# Patient Record
Sex: MALE | Race: WHITE | Employment: OTHER | URBAN - NONMETROPOLITAN AREA
[De-identification: names, ages, dates, MRNs, and addresses within clinical notes are randomized per-mention and may not be internally consistent; named-entity substitution may affect disease eponyms.]

---

## 2019-11-05 DIAGNOSIS — G35 MULTIPLE SCLEROSIS (HCC): ICD-10-CM

## 2019-11-06 ENCOUNTER — HOSPITAL ENCOUNTER (OUTPATIENT)
Dept: INFUSION THERAPY | Age: 29
Setting detail: INFUSION SERIES
Discharge: HOME | End: 2019-11-06
Payer: COMMERCIAL

## 2019-11-06 DIAGNOSIS — G35 MULTIPLE SCLEROSIS (HCC): Primary | ICD-10-CM

## 2019-11-06 PROCEDURE — 2580000003 HC RX 258: Performed by: PSYCHIATRY & NEUROLOGY

## 2019-11-06 PROCEDURE — 96365 THER/PROPH/DIAG IV INF INIT: CPT

## 2019-11-06 PROCEDURE — 6360000002 HC RX W HCPCS: Performed by: PSYCHIATRY & NEUROLOGY

## 2019-11-06 RX ORDER — FLUOXETINE 20 MG/1
40 TABLET, FILM COATED ORAL
COMMUNITY
Start: 2018-10-03 | End: 2020-11-09 | Stop reason: ALTCHOICE

## 2019-11-06 RX ORDER — DEXTROAMPHETAMINE SACCHARATE, AMPHETAMINE ASPARTATE MONOHYDRATE, DEXTROAMPHETAMINE SULFATE AND AMPHETAMINE SULFATE 7.5; 7.5; 7.5; 7.5 MG/1; MG/1; MG/1; MG/1
30 CAPSULE, EXTENDED RELEASE ORAL EVERY MORNING
COMMUNITY
Start: 2018-10-03

## 2019-11-06 RX ADMIN — SODIUM CHLORIDE 1000 MG: 9 INJECTION, SOLUTION INTRAVENOUS at 14:24

## 2019-11-07 ENCOUNTER — HOSPITAL ENCOUNTER (OUTPATIENT)
Dept: INFUSION THERAPY | Age: 29
Setting detail: INFUSION SERIES
Discharge: HOME | End: 2019-11-07
Payer: COMMERCIAL

## 2019-11-07 VITALS
HEART RATE: 79 BPM | OXYGEN SATURATION: 99 % | DIASTOLIC BLOOD PRESSURE: 72 MMHG | TEMPERATURE: 98.6 F | RESPIRATION RATE: 18 BRPM | SYSTOLIC BLOOD PRESSURE: 129 MMHG

## 2019-11-07 DIAGNOSIS — G35 MULTIPLE SCLEROSIS (HCC): Primary | ICD-10-CM

## 2019-11-07 PROCEDURE — 2580000003 HC RX 258: Performed by: PSYCHIATRY & NEUROLOGY

## 2019-11-07 PROCEDURE — 96365 THER/PROPH/DIAG IV INF INIT: CPT

## 2019-11-07 PROCEDURE — 6360000002 HC RX W HCPCS: Performed by: PSYCHIATRY & NEUROLOGY

## 2019-11-07 RX ADMIN — SODIUM CHLORIDE 1000 MG: 9 INJECTION, SOLUTION INTRAVENOUS at 13:23

## 2019-11-08 ENCOUNTER — HOSPITAL ENCOUNTER (OUTPATIENT)
Dept: INFUSION THERAPY | Age: 29
Setting detail: INFUSION SERIES
Discharge: HOME | End: 2019-11-08
Payer: COMMERCIAL

## 2019-11-08 VITALS
OXYGEN SATURATION: 97 % | RESPIRATION RATE: 17 BRPM | DIASTOLIC BLOOD PRESSURE: 86 MMHG | HEART RATE: 66 BPM | SYSTOLIC BLOOD PRESSURE: 139 MMHG | TEMPERATURE: 98.9 F

## 2019-11-08 DIAGNOSIS — G35 MULTIPLE SCLEROSIS (HCC): Primary | ICD-10-CM

## 2019-11-08 PROCEDURE — 96365 THER/PROPH/DIAG IV INF INIT: CPT

## 2019-11-08 PROCEDURE — 6360000002 HC RX W HCPCS: Performed by: PSYCHIATRY & NEUROLOGY

## 2019-11-08 PROCEDURE — 2580000003 HC RX 258: Performed by: PSYCHIATRY & NEUROLOGY

## 2019-11-08 RX ADMIN — SODIUM CHLORIDE 1000 MG: 9 INJECTION, SOLUTION INTRAVENOUS at 13:49

## 2020-05-04 ENCOUNTER — OFFICE VISIT (OUTPATIENT)
Age: 30
End: 2020-05-04

## 2020-05-04 VITALS — TEMPERATURE: 97.9 F | OXYGEN SATURATION: 98 %

## 2020-05-04 PROCEDURE — 99999 PR OFFICE/OUTPT VISIT,PROCEDURE ONLY: CPT | Performed by: NURSE PRACTITIONER

## 2020-05-04 NOTE — PATIENT INSTRUCTIONS
be washed thoroughly with soap and water. Clean all high-touch surfaces everyday  High touch surfaces include counters, tabletops, doorknobs, bathroom fixtures, toilets, phones, keyboards, tablets, and bedside tables. Also, clean any surfaces that may have blood, stool, or body fluids on them. Use a household cleaning spray or wipe, according to the label instructions. Labels contain instructions for safe and effective use of the cleaning product including precautions you should take when applying the product, such as wearing gloves and making sure you have good ventilation during use of the product. Monitor your symptoms  Seek prompt medical attention if your illness is worsening (e.g., difficulty breathing). Before seeking care, call your healthcare provider and tell them that you have, or are being evaluated for, COVID-19. Put on a facemask before you enter the facility. These steps will help the healthcare providers office to keep other people in the office or waiting room from getting infected or exposed. Ask your healthcare provider to call the local or [facility] health department. Persons who are placed under active monitoring or facilitated self-monitoring should follow instructions provided by their local health department or occupational health professionals, as appropriate. When working with your local health department check their available hours. If you have a medical emergency and need to call 911, notify the dispatch personnel that you have, or are being evaluated for COVID-19. If possible, put on a facemask before emergency medical services arrive. Discontinuing home isolation  Patients with confirmed COVID-19 should remain under home isolation precautions until the risk of secondary transmission to others is thought to be low.  The decision to discontinue home isolation precautions should be made on a case-by-case basis, in consultation with healthcare providers and state and [facility] health departments. [employer] allows you to send messages to your doctor, view your test results, renew your prescriptions, schedule appointments, view visit notes, and more. How Do I Sign Up? 1. In your Internet browser, go to [email or web address]  2. Click on the Sign Up Now link in the Sign In box. You will see the New Member Sign Up page. 3. Enter your [employer] Access Code exactly as it appears below. You will not need to use this code after youve completed the sign-up process. If you do not sign up before the expiration date, you must request a new code. [employer] Access Code: [email or web address]  Expires: 6/18/2020 11:34 AM    4. Enter your Social Security Number ([id number]) and Date of Birth (mm/dd/yyyy) as indicated and click Submit. You will be taken to the next sign-up page. 5. Create a [employer] ID. This will be your [employer] login ID and cannot be changed, so think of one that is secure and easy to remember. 6. Create a [employer] password. You can change your password at any time. 7. Enter your Password Reset Question and Answer. This can be used at a later time if you forget your password. 8. Enter your e-mail address. You will receive e-mail notification when new information is available in [address]. 9. Click Sign Up. You can now view your medical record. Additional Information  If you have questions, please contact the physician practice where you receive care. Remember, [employer] is NOT to be used for urgent needs. For medical emergencies, dial 911. For questions regarding your [employer] account call [phone]. If you have a clinical question, please call your doctor's office.

## 2020-05-06 LAB
REPORT: NORMAL
SARS-COV-2: NOT DETECTED
THIS TEST SENT TO: NORMAL

## 2020-05-14 ENCOUNTER — OFFICE VISIT (OUTPATIENT)
Age: 30
End: 2020-05-14

## 2020-05-16 LAB
REPORT: NORMAL
SARS-COV-2: NOT DETECTED
THIS TEST SENT TO: NORMAL

## 2020-06-08 ENCOUNTER — TELEMEDICINE (OUTPATIENT)
Dept: VASCULAR SURGERY | Age: 30
End: 2020-06-08
Payer: COMMERCIAL

## 2020-06-08 PROCEDURE — 99203 OFFICE O/P NEW LOW 30 MIN: CPT | Performed by: NURSE PRACTITIONER

## 2020-06-08 SDOH — HEALTH STABILITY: MENTAL HEALTH: HOW OFTEN DO YOU HAVE A DRINK CONTAINING ALCOHOL?: 2-3 TIMES A WEEK

## 2020-06-12 ENCOUNTER — HOSPITAL ENCOUNTER (OUTPATIENT)
Dept: VASCULAR LAB | Age: 30
Discharge: HOME | End: 2020-06-12
Payer: COMMERCIAL

## 2020-06-12 PROCEDURE — 93970 EXTREMITY STUDY: CPT

## 2020-08-18 ENCOUNTER — TELEPHONE (OUTPATIENT)
Dept: URGENT CARE | Facility: CLINIC | Age: 30
End: 2020-08-18

## 2020-08-18 PROCEDURE — 87635 SARS-COV-2 COVID-19 AMP PRB: CPT | Performed by: NURSE PRACTITIONER

## 2020-08-18 NOTE — TELEPHONE ENCOUNTER
COVID-19 Test Result   Telephone Encounter    Patient Name: Jakob Chong   : 1990   MRN: [id number]     COVID19   Date Value Ref Range Status   2020 Detected (C) Not Detected - Ref. Range Final        Patient was counseled as follows:  • (+) positive COVID-19 test result with or without symptoms   • The majority of patients with a positive test result will recover, symptom severity is variable among those infected.   • Because Jakob is symptomatic then he may discontinue home isolation when the following criteria are met:   o At least seven days have passed since symptoms first appeared AND   o At least three days (24 hours) have passed since recovery of symptoms (defined as resolution of fever without the use of fever-reducing medications and improvement in respiratory symptoms [eg, cough, shortness of breath])   • For support of non-emergent symptoms expected with this virus such as increased shortness of breath, fever, cough, or other questions, Jakob was asked to please call their primary care physician’s office.  • He was advised to seek care in the Emergency Department should extreme symptoms arise such as new onset confusion, extreme lethargy, hypoxia, or new hypotension.   · Questions were engaged and answered to the best of my ability, patient expressed verbal understanding of their test results and my advice.      Primary Care Physician verified as being: Provider, No Known      Electronically signed by MARY ELLEN Jackson, 20, 9:55 AM.

## 2020-09-17 ENCOUNTER — VIRTUAL VISIT (OUTPATIENT)
Dept: VASCULAR SURGERY | Age: 30
End: 2020-09-17
Payer: COMMERCIAL

## 2020-09-17 PROBLEM — I83.93 VARICOSE VEINS OF LEGS: Status: ACTIVE | Noted: 2020-09-17

## 2020-09-17 PROBLEM — I87.2 VENOUS REFLUX: Status: ACTIVE | Noted: 2020-09-17

## 2020-09-17 PROCEDURE — 99214 OFFICE O/P EST MOD 30 MIN: CPT | Performed by: NURSE PRACTITIONER

## 2020-09-17 NOTE — Clinical Note
Please call patient and let him know dr. Mateo Cheatham said we can do ablation left leg and left leg stab phlebectomy. See if he is ready to schedule. Will do in or.   Anwser any questions and give to jennifer to schedule

## 2020-09-17 NOTE — PROGRESS NOTES
2020    TELEHEALTH EVALUATION -- Audio/Visual (During LGMGT-52 public health emergency)    HPI:    Patient is located at home  Provider is located at [facility] [place]   Also present during call is no one    Janusz Field (:  1990) has requested an audio/video evaluation for the following concern(s):    He has a known history of of varicose veins and chronic venous insufficiency. He reports prominent veins on the  Bilateral leg(s), lower extremity edema on the  Left leg(s), the veins are painful -- severity:  moderate and pain is aggravated by upright posture. He reports previous treatment includes analgesics  Prescriptions strength hose for more than 3 months. He continues to have significant pain around area of varicosity. This is an aching type pain. He continues throughout the day limiting how long he can stand. He does not have a history of spontaneous rupture. Differential diagnosis for leg pain includes but is not limited to: varicose veins, peripheral vascular disease, arthritic pain, DVT, lumbar disc disease, and neurogenic pain. Prior to Visit Medications    Medication Sig Taking? Authorizing Provider   Glatiramer Acetate (COPAXONE SC) Inject into the skin  Historical Provider, MD   amphetamine-dextroamphetamine (ADDERALL XR) 30 MG extended release capsule Take 30 mg by mouth.   Historical Provider, MD   FLUoxetine (PROZAC) 20 MG tablet Take 40 mg by mouth  Historical Provider, MD       Social History     Tobacco Use    Smoking status: Never Smoker    Smokeless tobacco: Never Used   Substance Use Topics    Alcohol use: Yes     Frequency: 2-3 times a week    Drug use: Never        No Known Allergies,   Past Medical History:   Diagnosis Date    Depression     MS (multiple sclerosis) ([facility] [address]    ,   Past Surgical History:   Procedure Laterality Date    TYMPANOSTOMY TUBE PLACEMENT     ,   Social History     Tobacco Use    Smoking status: Never Smoker    Smokeless tobacco: Never Used   Substance Use Topics    Alcohol use: Yes     Frequency: 2-3 times a week    Drug use: Never   , History reviewed. No pertinent family history. ,   Health Maintenance   Topic Date Due    Varicella vaccine (1 of 2 - 2-dose childhood series) 02/20/1993    HIV screen  02/20/2007    DTaP/Tdap/Td vaccine (1 - Tdap) 02/20/2011    Flu vaccine (1) 09/01/2020    Hepatitis A vaccine  Aged Out    Hepatitis B vaccine  Aged Out    Hib vaccine  Aged Out    Meningococcal (ACWY) vaccine  Aged Out    Pneumococcal 0-64 years Vaccine  Aged Out       Review of Systems    Constitutional - no significant activity change, appetite change, or unexpected weight change. No fever or chills. No diaphoresis or significant fatigue. HENT - no significant rhinorrhea or epistaxis. No tinnitus or significant hearing loss. Eyes - no sudden vision change or amaurosis. Respiratory - no significant shortness of breath, wheezing, or stridor. No apnea, cough, or chest tightness associated with shortness of breath. Cardiovascular - no chest pain, syncope, or significant dizziness. No palpitations or significant leg swelling.  has not had claudication. Gastrointestinal -  has not had abdominal swelling or pain. No blood in stool. No severe constipation, diarrhea, nausea, or vomiting. Genitourinary - No difficulty urinating, dysuria, frequency, or urgency. No flank pain or hematuria. Musculoskeletal - has not had back pain, gait disturbance, or myalgia. Skin - no color change, rash, pallor, or new wound. Neurologic - no dizziness, facial asymmetry, or light headedness. No seizures. No speech difficulty or lateralizing weakness. Hematologic - no easy bruising or excessive bleeding. Psychiatric - no severe anxiety or nervousness. No confusion. Had covid about a month ago. He was tested [facility] urgent care and is out of quarantine  All other review of systems are negative.       PHYSICAL EXAMINATION:    Due to this being a TeleHealth encounter, evaluation of the following organ systems is limited: Vitals/Constitutional/EENT/Resp/CV/GI//MS/Neuro/Skin/Heme-Lymph-Imm. Constitutional - well developed, well nourished. No diaphoresis or acute distress. HENT - head normocephalic. Right external ear canal appears normal.  Left external ear canal appears normal.  Septum appears midline. Eyes - conjunctiva normal.   No exudate. No icterus. Neck- ROM appears normal, no tracheal deviation. Extremities -No cyanosis, clubbing, no edema. No signs atheroembolic event. Large visible varicose vein cluster at popliteal fossa left leg  Pulmonary - effort appears normal.  No respiratory distress. No accessory muscle use  Musculoskeletal -   Motor grossly intact in visible lower extremities and upper extremities  Neurologic - alert and oriented X 3. Cranial Nerves II-XII grossly intact  Skin - intact. No rash, erythema, or pallor. Psychiatric - mood, affect, and behavior appear normal.  Judgment and thought processes appear normal.    Venous scan -   Right Side: The greater saphenous vein exhibits reflux lasting for a     maximum of 1291 milliseconds in thesaphenofemoral junction. There is no     evidence of deep venous thrombosis in the segments insonated. There is     1848 m seconds of deep vein reflux.    Maria Alejandra Meriden is no short saphenous vein reflux.     Note also made of Right greater saphenous vein branch coming off at distal     thigh level with reflux of 5420 msec.          Left Side: The greater saphenous vein exhibits reflux lasting for a     maximum of 6609 milliseconds in the saphenofemoral junction. There is no     evidence of deep venous thrombosis in the segments insonated. There is     5883m seconds of deep vein reflux.    Maria Alejandra Meriden is no short saphenous vein reflux.    Maria Alejandra Meriden is a large branch (17.1mm diameter) comming off the Greater     Saphenous vein just above the knee.  Reflux of 6609 msec is demonstrated.     Note Study done with standing protocol.             Individual images were reviewed. Results were reviewed with the patient. Options have been discussed with the patient including continued medical management vs. proceeding with left GSV ablation and stab phlebectomy left leg. Patient has opted to proceed with this. Risks have been discussed with the patient including but not limited to MI, death, CVA, bleed, nerve injury, and infection. ASSESSMENT/PLAN:  1. Varicose veins of both lower extremities with pain    2. Venous reflux        No follow-ups on file. Support hose  20-30 mmHg compression  Start/Continue ASA EC 81 mg daily  Leg elevation  Good moisturization  Good skin care  Diet   excercise  Left gsv ablation and stab phlebectomy left leg  Strongly encouraged start/continue statin therapy  Recommended no smoking  An  electronic signature was used to authenticate this note. --PAUAL Dennison on 9/17/2020 at 9:51 AM        Pursuant to the emergency declaration under the [facility], [facility] waiver authority and the [employer] and [employer] Act, this Virtual  Visit was conducted, with patient's consent, to reduce the patient's risk of exposure to COVID-19 and provide continuity of care for an established patient. Services were provided through a video synchronous discussion virtually to substitute for in-person clinic visit.

## 2020-09-18 ENCOUNTER — TELEPHONE (OUTPATIENT)
Dept: VASCULAR SURGERY | Age: 30
End: 2020-09-18

## 2020-09-18 RX ORDER — SODIUM CHLORIDE 0.9 % (FLUSH) 0.9 %
10 SYRINGE (ML) INJECTION EVERY 12 HOURS SCHEDULED
Status: CANCELLED | OUTPATIENT
Start: 2020-09-18

## 2020-09-18 RX ORDER — SODIUM CHLORIDE 0.9 % (FLUSH) 0.9 %
10 SYRINGE (ML) INJECTION PRN
Status: CANCELLED | OUTPATIENT
Start: 2020-09-18

## 2020-09-18 NOTE — TELEPHONE ENCOUNTER
----- Message from PAULA Canas sent at 9/18/2020  4:32 AM CDT -----  Please call patient and let him know dr. Kelly Mcbride said we can do ablation left leg and left leg stab phlebectomy. See if he is ready to schedule. Will do in or.   Anwser any questions and give to jennifer to schedule

## 2020-09-18 NOTE — H&P
2020    TELEHEALTH EVALUATION -- Audio/Visual (During KTNBW-60 public health emergency)    HPI:    Patient is located at home  Provider is located at [facility] [place]   Also present during call is no one    Bisijonnie Walden (:  1990) has requested an audio/video evaluation for the following concern(s):    He has a known history of of varicose veins and chronic venous insufficiency. He reports prominent veins on the  Bilateral leg(s), lower extremity edema on the  Left leg(s), the veins are painful -- severity:  moderate and pain is aggravated by upright posture. He reports previous treatment includes analgesics  Prescriptions strength hose for more than 3 months. He continues to have significant pain around area of varicosity. This is an aching type pain. He continues throughout the day limiting how long he can stand. He does not have a history of spontaneous rupture. Differential diagnosis for leg pain includes but is not limited to: varicose veins, peripheral vascular disease, arthritic pain, DVT, lumbar disc disease, and neurogenic pain. Prior to Visit Medications    Medication Sig Taking? Authorizing Provider   Glatiramer Acetate (COPAXONE SC) Inject into the skin  Historical Provider, MD   amphetamine-dextroamphetamine (ADDERALL XR) 30 MG extended release capsule Take 30 mg by mouth.   Historical Provider, MD   FLUoxetine (PROZAC) 20 MG tablet Take 40 mg by mouth  Historical Provider, MD       Social History     Tobacco Use    Smoking status: Never Smoker    Smokeless tobacco: Never Used   Substance Use Topics    Alcohol use: Yes     Frequency: 2-3 times a week    Drug use: Never        No Known Allergies,   Past Medical History:   Diagnosis Date    Depression     MS (multiple sclerosis) ([facility] [address]    ,   Past Surgical History:   Procedure Laterality Date    TYMPANOSTOMY TUBE PLACEMENT     ,   Social History     Tobacco Use    Smoking status: Never Smoker    Smokeless tobacco: Never Used   Substance Use Topics    Alcohol use: Yes     Frequency: 2-3 times a week    Drug use: Never   , History reviewed. No pertinent family history. ,   Health Maintenance   Topic Date Due    Varicella vaccine (1 of 2 - 2-dose childhood series) 02/20/1993    HIV screen  02/20/2007    DTaP/Tdap/Td vaccine (1 - Tdap) 02/20/2011    Flu vaccine (1) 09/01/2020    Hepatitis A vaccine  Aged Out    Hepatitis B vaccine  Aged Out    Hib vaccine  Aged Out    Meningococcal (ACWY) vaccine  Aged Out    Pneumococcal 0-64 years Vaccine  Aged Out       Review of Systems    Constitutional - no significant activity change, appetite change, or unexpected weight change. No fever or chills. No diaphoresis or significant fatigue. HENT - no significant rhinorrhea or epistaxis. No tinnitus or significant hearing loss. Eyes - no sudden vision change or amaurosis. Respiratory - no significant shortness of breath, wheezing, or stridor. No apnea, cough, or chest tightness associated with shortness of breath. Cardiovascular - no chest pain, syncope, or significant dizziness. No palpitations or significant leg swelling.  has not had claudication. Gastrointestinal -  has not had abdominal swelling or pain. No blood in stool. No severe constipation, diarrhea, nausea, or vomiting. Genitourinary - No difficulty urinating, dysuria, frequency, or urgency. No flank pain or hematuria. Musculoskeletal - has not had back pain, gait disturbance, or myalgia. Skin - no color change, rash, pallor, or new wound. Neurologic - no dizziness, facial asymmetry, or light headedness. No seizures. No speech difficulty or lateralizing weakness. Hematologic - no easy bruising or excessive bleeding. Psychiatric - no severe anxiety or nervousness. No confusion. Had covid about a month ago. He was tested [facility] urgent care and is out of quarantine  All other review of systems are negative.       PHYSICAL EXAMINATION:    Due to this being a TeleHealth encounter, evaluation of the following organ systems is limited: Vitals/Constitutional/EENT/Resp/CV/GI//MS/Neuro/Skin/Heme-Lymph-Imm. Constitutional - well developed, well nourished. No diaphoresis or acute distress. HENT - head normocephalic. Right external ear canal appears normal.  Left external ear canal appears normal.  Septum appears midline. Eyes - conjunctiva normal.   No exudate. No icterus. Neck- ROM appears normal, no tracheal deviation. Extremities -No cyanosis, clubbing, no edema. No signs atheroembolic event. Large visible varicose vein cluster at popliteal fossa left leg  Pulmonary - effort appears normal.  No respiratory distress. No accessory muscle use  Musculoskeletal -   Motor grossly intact in visible lower extremities and upper extremities  Neurologic - alert and oriented X 3. Cranial Nerves II-XII grossly intact  Skin - intact. No rash, erythema, or pallor. Psychiatric - mood, affect, and behavior appear normal.  Judgment and thought processes appear normal.    Venous scan -   Right Side: The greater saphenous vein exhibits reflux lasting for a     maximum of 1291 milliseconds in thesaphenofemoral junction. There is no     evidence of deep venous thrombosis in the segments insonated. There is     1848 m seconds of deep vein reflux.    Monika Friday is no short saphenous vein reflux.     Note also made of Right greater saphenous vein branch coming off at distal     thigh level with reflux of 5420 msec.          Left Side: The greater saphenous vein exhibits reflux lasting for a     maximum of 6609 milliseconds in the saphenofemoral junction. There is no     evidence of deep venous thrombosis in the segments insonated. There is     5883m seconds of deep vein reflux.    Monika Friday is no short saphenous vein reflux.    Monika Friday is a large branch (17.1mm diameter) comming off the Greater     Saphenous vein just above the knee.  Reflux of 6609 msec is demonstrated.     Note Study done with standing protocol.             Individual images were reviewed. Results were reviewed with the patient. Options have been discussed with the patient including continued medical management vs. proceeding with left GSV ablation and stab phlebectomy left leg. Patient has opted to proceed with this. Risks have been discussed with the patient including but not limited to MI, death, CVA, bleed, nerve injury, and infection. ASSESSMENT/PLAN:  1. Varicose veins of both lower extremities with pain    2. Venous reflux        No follow-ups on file. Support hose  20-30 mmHg compression  Start/Continue ASA EC 81 mg daily  Leg elevation  Good moisturization  Good skin care  Diet   excercise  Left gsv ablation and stab phlebectomy left leg  Strongly encouraged start/continue statin therapy  Recommended no smoking  An  electronic signature was used to authenticate this note. --PAULA Hubbard on 9/17/2020 at 9:51 AM        Pursuant to the emergency declaration under the [facility], [facility] waiver authority and the [employer] and [employer] Act, this Virtual  Visit was conducted, with patient's consent, to reduce the patient's risk of exposure to COVID-19 and provide continuity of care for an established patient. Services were provided through a video synchronous discussion virtually to substitute for in-person clinic visit.

## 2020-09-18 NOTE — TELEPHONE ENCOUNTER
I sw the pt to let him know Dr. Jamal Machado recommends him to have a L GSV RFA as well as a stab phlebectomy of the LLE in the OR. I asked the pt if he is willing to proceed with these recommendations? Pt states yes. I asked the pt if he has any questions regarding these procedures? Pt states Katarina Prakash filled him in on all the details during their visit and he has a good understanding on what to expect. I will pass this information along to [facility] for further scheduling. Pt voiced understanding and is aware of the POC.

## 2020-10-08 ENCOUNTER — TELEPHONE (OUTPATIENT)
Dept: VASCULAR SURGERY | Age: 30
End: 2020-10-08

## 2020-10-08 NOTE — TELEPHONE ENCOUNTER
Unable to reach patient by phone in regards to scheduling surgery with Dr. Terrell Birch, left message for patient to call the office. 10/12/20 3:44pm Spoke with patient, details/instructions and medications were addressed (see my chart message) for surgery at [place] on 11/13/20 with Dr. Terrell Birch. He will need to arrive at the [address] [place] [address] at 7:30am. He will need a  to take him home. He will need to use Bactroban Ointment prior to surgery. He will need to apply the Bactroban Ointment to the inside of his nose on each side twice a day for 5 days starting on 11/2/20. The Bactroban Ointment has been sent to the [facility] in [facility]. To ensure the health and safety of our patients and staff, [address] [place] [address],[address] has implemented visitor restrictions. Only one person will be allowed to accompany you for your procedure. If you or your visitor are exhibiting signs & symptoms of illness such as fever, cough, sore throat or body aches, we ask that you reschedule your procedure to a later date after your symptoms have been resolved. New policy requires that anyone who comes into the hospital will be required to wear a face mask. A cloth mask is acceptable. He will need to register at the [address] [place] [address] on Mon. 11/9/20 at 9:30am for pre-op and COVID-19 test. After the COVID-19 test we suggest that he self quarantine until surgery. After his surgery he will need to have a left leg venous scan and follow-up appointment. He will need to register at the [facility] (go to the main hospital entrance doors and turn left) on Mon. 11/16/20 at 12:30pm for a left leg venous scan at 1:00pm. We have him scheduled for a VV appointment with PAULA Howell on Tues. 11/17/20 at 10:00am. Written instructions were sent to patient through my chart message.

## 2020-11-09 ENCOUNTER — HOSPITAL ENCOUNTER (OUTPATIENT)
Dept: GENERAL RADIOLOGY | Age: 30
Discharge: HOME | End: 2020-11-09
Payer: COMMERCIAL

## 2020-11-09 ENCOUNTER — HOSPITAL ENCOUNTER (OUTPATIENT)
Dept: PREADMISSION TESTING | Age: 30
Discharge: HOME | End: 2020-11-13
Payer: COMMERCIAL

## 2020-11-09 VITALS — HEIGHT: 72 IN | WEIGHT: 195 LBS | BODY MASS INDEX: 26.41 KG/M2

## 2020-11-09 LAB
ANION GAP SERPL CALCULATED.3IONS-SCNC: 9 MMOL/L (ref 7–19)
APTT: 30.2 SEC (ref 26–36.2)
BUN BLDV-MCNC: 11 MG/DL (ref 6–20)
CALCIUM SERPL-MCNC: 9.2 MG/DL (ref 8.6–10)
CHLORIDE BLD-SCNC: 105 MMOL/L (ref 98–111)
CO2: 26 MMOL/L (ref 22–29)
CREAT SERPL-MCNC: 0.7 MG/DL (ref 0.5–1.2)
GFR AFRICAN AMERICAN: >59
GFR NON-AFRICAN AMERICAN: >60
GLUCOSE BLD-MCNC: 96 MG/DL (ref 74–109)
HCT VFR BLD CALC: 43.5 % (ref 42–52)
HEMOGLOBIN: 14.9 G/DL (ref 14–18)
INR BLD: 1.09 (ref 0.88–1.18)
MCH RBC QN AUTO: 30.8 PG (ref 27–31)
MCHC RBC AUTO-ENTMCNC: 34.3 G/DL (ref 33–37)
MCV RBC AUTO: 89.9 FL (ref 80–94)
PDW BLD-RTO: 12.3 % (ref 11.5–14.5)
PLATELET # BLD: 224 K/UL (ref 130–400)
PMV BLD AUTO: 9 FL (ref 9.4–12.4)
POTASSIUM SERPL-SCNC: 4.4 MMOL/L (ref 3.5–5)
PROTHROMBIN TIME: 14 SEC (ref 12–14.6)
RBC # BLD: 4.84 M/UL (ref 4.7–6.1)
SODIUM BLD-SCNC: 140 MMOL/L (ref 136–145)
WBC # BLD: 5.5 K/UL (ref 4.8–10.8)

## 2020-11-09 PROCEDURE — 85027 COMPLETE CBC AUTOMATED: CPT

## 2020-11-09 PROCEDURE — 71046 X-RAY EXAM CHEST 2 VIEWS: CPT

## 2020-11-09 PROCEDURE — 93005 ELECTROCARDIOGRAM TRACING: CPT

## 2020-11-09 PROCEDURE — 87081 CULTURE SCREEN ONLY: CPT

## 2020-11-09 PROCEDURE — 85610 PROTHROMBIN TIME: CPT

## 2020-11-09 PROCEDURE — 85730 THROMBOPLASTIN TIME PARTIAL: CPT

## 2020-11-09 PROCEDURE — 80048 BASIC METABOLIC PNL TOTAL CA: CPT

## 2020-11-10 LAB
EKG P AXIS: -17 DEGREES
EKG P-R INTERVAL: 130 MS
EKG Q-T INTERVAL: 344 MS
EKG QRS DURATION: 88 MS
EKG QTC CALCULATION (BAZETT): 365 MS
EKG T AXIS: 45 DEGREES
MRSA CULTURE ONLY: NORMAL

## 2020-11-10 PROCEDURE — 93010 ELECTROCARDIOGRAM REPORT: CPT | Performed by: INTERNAL MEDICINE

## 2020-11-12 ENCOUNTER — TELEPHONE (OUTPATIENT)
Dept: VASCULAR SURGERY | Age: 30
End: 2020-11-12

## 2020-11-12 ENCOUNTER — PREP FOR PROCEDURE (OUTPATIENT)
Dept: VASCULAR SURGERY | Age: 30
End: 2020-11-12

## 2020-11-12 LAB — SARS-COV-2, NAA: NOT DETECTED

## 2020-11-12 RX ORDER — SODIUM CHLORIDE 0.9 % (FLUSH) 0.9 %
10 SYRINGE (ML) INJECTION PRN
Status: CANCELLED | OUTPATIENT
Start: 2020-11-12

## 2020-11-12 RX ORDER — SODIUM CHLORIDE 0.9 % (FLUSH) 0.9 %
10 SYRINGE (ML) INJECTION EVERY 12 HOURS SCHEDULED
Status: CANCELLED | OUTPATIENT
Start: 2020-11-12

## 2020-11-12 NOTE — H&P (VIEW-ONLY)
status: Never Smoker    Smokeless tobacco: Never Used   Substance Use Topics    Alcohol use: Yes       Frequency: 2-3 times a week    Drug use: Never   , History reviewed. No pertinent family history. ,        Health Maintenance   Topic Date Due    Varicella vaccine (1 of 2 - 2-dose childhood series) 02/20/1993    HIV screen  02/20/2007    DTaP/Tdap/Td vaccine (1 - Tdap) 02/20/2011    Flu vaccine (1) 09/01/2020    Hepatitis A vaccine  Aged Out    Hepatitis B vaccine  Aged Out    Hib vaccine  Aged Out    Meningococcal (ACWY) vaccine  Aged Out    Pneumococcal 0-64 years Vaccine  Aged Out         Review of Systems     Constitutional - no significant activity change, appetite change, or unexpected weight change. No fever or chills. No diaphoresis or significant fatigue. HENT - no significant rhinorrhea or epistaxis. No tinnitus or significant hearing loss. Eyes - no sudden vision change or amaurosis. Respiratory - no significant shortness of breath, wheezing, or stridor. No apnea, cough, or chest tightness associated with shortness of breath. Cardiovascular - no chest pain, syncope, or significant dizziness. No palpitations or significant leg swelling.  has not had claudication. Gastrointestinal -  has not had abdominal swelling or pain. No blood in stool. No severe constipation, diarrhea, nausea, or vomiting. Genitourinary - No difficulty urinating, dysuria, frequency, or urgency. No flank pain or hematuria. Musculoskeletal - has not had back pain, gait disturbance, or myalgia. Skin - no color change, rash, pallor, or new wound. Neurologic - no dizziness, facial asymmetry, or light headedness. No seizures. No speech difficulty or lateralizing weakness. Hematologic - no easy bruising or excessive bleeding. Psychiatric - no severe anxiety or nervousness. No confusion. Had covid about a month ago.   He was tested [facility] urgent care and is out of quarantine  All other review of systems are negative.        PHYSICAL EXAMINATION:     Due to this being a TeleHealth encounter, evaluation of the following organ systems is limited: Vitals/Constitutional/EENT/Resp/CV/GI//MS/Neuro/Skin/Heme-Lymph-Imm. Constitutional - well developed, well nourished. No diaphoresis or acute distress. HENT - head normocephalic. Right external ear canal appears normal.  Left external ear canal appears normal.  Septum appears midline. Eyes - conjunctiva normal.   No exudate. No icterus. Neck- ROM appears normal, no tracheal deviation. Extremities -No cyanosis, clubbing, no edema. No signs atheroembolic event. Large visible varicose vein cluster at popliteal fossa left leg  Pulmonary - effort appears normal.  No respiratory distress. No accessory muscle use  Musculoskeletal -   Motor grossly intact in visible lower extremities and upper extremities  Neurologic - alert and oriented X 3. Cranial Nerves II-XII grossly intact  Skin - intact. No rash, erythema, or pallor. Psychiatric - mood, affect, and behavior appear normal.  Judgment and thought processes appear normal.     Venous scan -   Right Side: The greater saphenous vein exhibits reflux lasting for a     maximum of 1291 milliseconds in thesaphenofemoral junction. There is no     evidence of deep venous thrombosis in the segments insonated. There is     1848 m seconds of deep vein reflux.    Yusuf Honour is no short saphenous vein reflux.     Note also made of Right greater saphenous vein branch coming off at distal     thigh level with reflux of 5420 msec.          Left Side: The greater saphenous vein exhibits reflux lasting for a     maximum of 6609 milliseconds in the saphenofemoral junction. There is no     evidence of deep venous thrombosis in the segments insonated.  There is     5883m seconds of deep vein reflux.    Yusuf Honour is no short saphenous vein reflux.    Yusuf Honour is a large branch (17.1mm diameter) comming off the Greater     Saphenous vein just above the knee. Reflux of 6609 msec is demonstrated.     Note Study done with standing protocol.               Individual images were reviewed. Results were reviewed with the patient.     Options have been discussed with the patient including continued medical management vs. proceeding with left GSV ablation and stab phlebectomy left leg. Patient has opted to proceed with this. Risks have been discussed with the patient including but not limited to MI, death, CVA, bleed, nerve injury, and infection.          ASSESSMENT/PLAN:  1. Varicose veins of both lower extremities with pain    2.  Venous reflux                Support hose  20-30 mmHg compression  Start/Continue ASA EC 81 mg daily  Leg elevation  Good moisturization  Good skin care  Diet   excercise  Left gsv ablation and stab phlebectomy left leg

## 2020-11-12 NOTE — H&P
2020     TELEHEALTH EVALUATION -- Audio/Visual (During OHVSY-23 public health emergency)     HPI:     Patient is located at home  Provider is located at [facility] [place]   Also present during call is no one     Bouchra Rich (:  1990) has requested an audio/video evaluation for the following concern(s):     He has a known history of of varicose veins and chronic venous insufficiency. He reports prominent veins on the  Bilateral leg(s), lower extremity edema on the  Left leg(s), the veins are painful -- severity:  moderate and pain is aggravated by upright posture. He reports previous treatment includes analgesics  Prescriptions strength hose for more than 3 months. He continues to have significant pain around area of varicosity. This is an aching type pain. He continues throughout the day limiting how long he can stand. He does not have a history of spontaneous rupture.     Differential diagnosis for leg pain includes but is not limited to: varicose veins, peripheral vascular disease, arthritic pain, DVT, lumbar disc disease, and neurogenic pain.                    Prior to Visit Medications    Medication Sig Taking? Authorizing Provider   Glatiramer Acetate (COPAXONE SC) Inject into the skin   Historical Provider, MD   amphetamine-dextroamphetamine (ADDERALL XR) 30 MG extended release capsule Take 30 mg by mouth.   Historical Provider, MD   FLUoxetine (PROZAC) 20 MG tablet Take 40 mg by mouth   Historical Provider, MD         Social History      Tobacco Use    Smoking status: Never Smoker    Smokeless tobacco: Never Used   Substance Use Topics    Alcohol use:  Yes       Frequency: 2-3 times a week    Drug use: Never         No Known Allergies,        Past Medical History:   Diagnosis Date    Depression      MS (multiple sclerosis) (HCC)     ,         Past Surgical History:   Procedure Laterality Date    TYMPANOSTOMY TUBE PLACEMENT       ,   Social History            Tobacco Use    Smoking negative.        PHYSICAL EXAMINATION:     Due to this being a TeleHealth encounter, evaluation of the following organ systems is limited: Vitals/Constitutional/EENT/Resp/CV/GI//MS/Neuro/Skin/Heme-Lymph-Imm. Constitutional - well developed, well nourished. No diaphoresis or acute distress. HENT - head normocephalic. Right external ear canal appears normal.  Left external ear canal appears normal.  Septum appears midline. Eyes - conjunctiva normal.   No exudate. No icterus. Neck- ROM appears normal, no tracheal deviation. Extremities -No cyanosis, clubbing, no edema. No signs atheroembolic event. Large visible varicose vein cluster at popliteal fossa left leg  Pulmonary - effort appears normal.  No respiratory distress. No accessory muscle use  Musculoskeletal -   Motor grossly intact in visible lower extremities and upper extremities  Neurologic - alert and oriented X 3. Cranial Nerves II-XII grossly intact  Skin - intact. No rash, erythema, or pallor. Psychiatric - mood, affect, and behavior appear normal.  Judgment and thought processes appear normal.     Venous scan -   Right Side: The greater saphenous vein exhibits reflux lasting for a     maximum of 1291 milliseconds in thesaphenofemoral junction. There is no     evidence of deep venous thrombosis in the segments insonated. There is     1848 m seconds of deep vein reflux.    [place] is no short saphenous vein reflux.     Note also made of Right greater saphenous vein branch coming off at distal     thigh level with reflux of 5420 msec.          Left Side: The greater saphenous vein exhibits reflux lasting for a     maximum of 6609 milliseconds in the saphenofemoral junction. There is no     evidence of deep venous thrombosis in the segments insonated.  There is     5883m seconds of deep vein reflux.    [place] is no short saphenous vein reflux.    [place] is a large branch (17.1mm diameter) comming off the Greater     Saphenous vein just above the knee. Reflux of 6609 msec is demonstrated.     Note Study done with standing protocol.               Individual images were reviewed. Results were reviewed with the patient.     Options have been discussed with the patient including continued medical management vs. proceeding with left GSV ablation and stab phlebectomy left leg. Patient has opted to proceed with this. Risks have been discussed with the patient including but not limited to MI, death, CVA, bleed, nerve injury, and infection.          ASSESSMENT/PLAN:  1. Varicose veins of both lower extremities with pain    2.  Venous reflux                Support hose  20-30 mmHg compression  Start/Continue ASA EC 81 mg daily  Leg elevation  Good moisturization  Good skin care  Diet   excercise  Left gsv ablation and stab phlebectomy left leg

## 2020-11-12 NOTE — TELEPHONE ENCOUNTER
Spoke with patient to confirm  arrival time of 7:30am for surgery with Dr. Daisy Mathis on 11/13/20.

## 2020-11-13 ENCOUNTER — ANESTHESIA EVENT (OUTPATIENT)
Dept: OPERATING ROOM | Age: 30
End: 2020-11-13
Payer: COMMERCIAL

## 2020-11-13 ENCOUNTER — ANESTHESIA (OUTPATIENT)
Dept: OPERATING ROOM | Age: 30
End: 2020-11-13
Payer: COMMERCIAL

## 2020-11-13 ENCOUNTER — HOSPITAL ENCOUNTER (OUTPATIENT)
Age: 30
Setting detail: OUTPATIENT SURGERY
Discharge: HOME | End: 2020-11-13
Attending: SURGERY | Admitting: SURGERY
Payer: COMMERCIAL

## 2020-11-13 VITALS
TEMPERATURE: 97.9 F | DIASTOLIC BLOOD PRESSURE: 74 MMHG | WEIGHT: 190 LBS | OXYGEN SATURATION: 97 % | HEART RATE: 69 BPM | BODY MASS INDEX: 25.73 KG/M2 | SYSTOLIC BLOOD PRESSURE: 120 MMHG | RESPIRATION RATE: 16 BRPM | HEIGHT: 72 IN

## 2020-11-13 VITALS
OXYGEN SATURATION: 99 % | DIASTOLIC BLOOD PRESSURE: 57 MMHG | RESPIRATION RATE: 4 BRPM | SYSTOLIC BLOOD PRESSURE: 124 MMHG | TEMPERATURE: 96 F

## 2020-11-13 PROBLEM — I83.812 VARICOSE VEINS OF LEFT LOWER EXTREMITY WITH PAIN: Status: ACTIVE | Noted: 2020-11-13

## 2020-11-13 PROBLEM — I83.93 VARICOSE VEINS OF LEGS: Status: RESOLVED | Noted: 2020-09-17 | Resolved: 2020-11-13

## 2020-11-13 PROCEDURE — 6360000002 HC RX W HCPCS: Performed by: NURSE ANESTHETIST, CERTIFIED REGISTERED

## 2020-11-13 PROCEDURE — 7100000000 HC PACU RECOVERY - FIRST 15 MIN: Performed by: SURGERY

## 2020-11-13 PROCEDURE — 7100000001 HC PACU RECOVERY - ADDTL 15 MIN: Performed by: SURGERY

## 2020-11-13 PROCEDURE — C1760 CLOSURE DEV, VASC: HCPCS | Performed by: SURGERY

## 2020-11-13 PROCEDURE — 6360000002 HC RX W HCPCS: Performed by: NURSE PRACTITIONER

## 2020-11-13 PROCEDURE — 3600000015 HC SURGERY LEVEL 5 ADDTL 15MIN: Performed by: SURGERY

## 2020-11-13 PROCEDURE — 3600000005 HC SURGERY LEVEL 5 BASE: Performed by: SURGERY

## 2020-11-13 PROCEDURE — 2580000003 HC RX 258: Performed by: ANESTHESIOLOGY

## 2020-11-13 PROCEDURE — C1894 INTRO/SHEATH, NON-LASER: HCPCS | Performed by: SURGERY

## 2020-11-13 PROCEDURE — C1769 GUIDE WIRE: HCPCS | Performed by: SURGERY

## 2020-11-13 PROCEDURE — 2709999900 HC NON-CHARGEABLE SUPPLY: Performed by: SURGERY

## 2020-11-13 PROCEDURE — 88304 TISSUE EXAM BY PATHOLOGIST: CPT

## 2020-11-13 PROCEDURE — 2580000003 HC RX 258: Performed by: SURGERY

## 2020-11-13 PROCEDURE — 37799 UNLISTED PX VASCULAR SURGERY: CPT | Performed by: SURGERY

## 2020-11-13 PROCEDURE — 2500000003 HC RX 250 WO HCPCS: Performed by: SURGERY

## 2020-11-13 PROCEDURE — 3700000000 HC ANESTHESIA ATTENDED CARE: Performed by: SURGERY

## 2020-11-13 PROCEDURE — 2500000003 HC RX 250 WO HCPCS: Performed by: NURSE ANESTHETIST, CERTIFIED REGISTERED

## 2020-11-13 PROCEDURE — 6360000002 HC RX W HCPCS: Performed by: ANESTHESIOLOGY

## 2020-11-13 PROCEDURE — 7100000010 HC PHASE II RECOVERY - FIRST 15 MIN: Performed by: SURGERY

## 2020-11-13 PROCEDURE — 3700000001 HC ADD 15 MINUTES (ANESTHESIA): Performed by: SURGERY

## 2020-11-13 PROCEDURE — 36475 ENDOVENOUS RF 1ST VEIN: CPT | Performed by: SURGERY

## 2020-11-13 RX ORDER — HYDROMORPHONE HYDROCHLORIDE 1 MG/ML
0.25 INJECTION, SOLUTION INTRAMUSCULAR; INTRAVENOUS; SUBCUTANEOUS EVERY 5 MIN PRN
Status: DISCONTINUED | OUTPATIENT
Start: 2020-11-13 | End: 2020-11-13 | Stop reason: HOSPADM

## 2020-11-13 RX ORDER — PROPOFOL 10 MG/ML
INJECTION, EMULSION INTRAVENOUS PRN
Status: DISCONTINUED | OUTPATIENT
Start: 2020-11-13 | End: 2020-11-13 | Stop reason: SDUPTHER

## 2020-11-13 RX ORDER — HYDRALAZINE HYDROCHLORIDE 20 MG/ML
5 INJECTION INTRAMUSCULAR; INTRAVENOUS EVERY 10 MIN PRN
Status: DISCONTINUED | OUTPATIENT
Start: 2020-11-13 | End: 2020-11-13 | Stop reason: HOSPADM

## 2020-11-13 RX ORDER — DEXAMETHASONE SODIUM PHOSPHATE 10 MG/ML
INJECTION, SOLUTION INTRAMUSCULAR; INTRAVENOUS PRN
Status: DISCONTINUED | OUTPATIENT
Start: 2020-11-13 | End: 2020-11-13 | Stop reason: SDUPTHER

## 2020-11-13 RX ORDER — SODIUM CHLORIDE, SODIUM LACTATE, POTASSIUM CHLORIDE, CALCIUM CHLORIDE 600; 310; 30; 20 MG/100ML; MG/100ML; MG/100ML; MG/100ML
INJECTION, SOLUTION INTRAVENOUS CONTINUOUS
Status: DISCONTINUED | OUTPATIENT
Start: 2020-11-13 | End: 2020-11-13 | Stop reason: HOSPADM

## 2020-11-13 RX ORDER — MIDAZOLAM HYDROCHLORIDE 1 MG/ML
2 INJECTION INTRAMUSCULAR; INTRAVENOUS
Status: COMPLETED | OUTPATIENT
Start: 2020-11-13 | End: 2020-11-13

## 2020-11-13 RX ORDER — FENTANYL CITRATE 50 UG/ML
INJECTION, SOLUTION INTRAMUSCULAR; INTRAVENOUS PRN
Status: DISCONTINUED | OUTPATIENT
Start: 2020-11-13 | End: 2020-11-13 | Stop reason: SDUPTHER

## 2020-11-13 RX ORDER — FENTANYL CITRATE 50 UG/ML
50 INJECTION, SOLUTION INTRAMUSCULAR; INTRAVENOUS
Status: DISCONTINUED | OUTPATIENT
Start: 2020-11-13 | End: 2020-11-13 | Stop reason: HOSPADM

## 2020-11-13 RX ORDER — LIDOCAINE HYDROCHLORIDE 10 MG/ML
1 INJECTION, SOLUTION EPIDURAL; INFILTRATION; INTRACAUDAL; PERINEURAL
Status: DISCONTINUED | OUTPATIENT
Start: 2020-11-13 | End: 2020-11-13 | Stop reason: HOSPADM

## 2020-11-13 RX ORDER — HYDROMORPHONE HYDROCHLORIDE 1 MG/ML
0.5 INJECTION, SOLUTION INTRAMUSCULAR; INTRAVENOUS; SUBCUTANEOUS EVERY 5 MIN PRN
Status: DISCONTINUED | OUTPATIENT
Start: 2020-11-13 | End: 2020-11-13 | Stop reason: HOSPADM

## 2020-11-13 RX ORDER — LIDOCAINE HYDROCHLORIDE 10 MG/ML
INJECTION, SOLUTION EPIDURAL; INFILTRATION; INTRACAUDAL; PERINEURAL PRN
Status: DISCONTINUED | OUTPATIENT
Start: 2020-11-13 | End: 2020-11-13 | Stop reason: SDUPTHER

## 2020-11-13 RX ORDER — SODIUM CHLORIDE 0.9 % (FLUSH) 0.9 %
10 SYRINGE (ML) INJECTION EVERY 12 HOURS SCHEDULED
Status: DISCONTINUED | OUTPATIENT
Start: 2020-11-13 | End: 2020-11-13 | Stop reason: HOSPADM

## 2020-11-13 RX ORDER — ONDANSETRON 2 MG/ML
INJECTION INTRAMUSCULAR; INTRAVENOUS PRN
Status: DISCONTINUED | OUTPATIENT
Start: 2020-11-13 | End: 2020-11-13 | Stop reason: SDUPTHER

## 2020-11-13 RX ORDER — HYDROCODONE BITARTRATE AND ACETAMINOPHEN 5; 325 MG/1; MG/1
2 TABLET ORAL EVERY 4 HOURS PRN
Status: DISCONTINUED | OUTPATIENT
Start: 2020-11-13 | End: 2020-11-13 | Stop reason: HOSPADM

## 2020-11-13 RX ORDER — HYDROCODONE BITARTRATE AND ACETAMINOPHEN 5; 325 MG/1; MG/1
1 TABLET ORAL EVERY 8 HOURS PRN
Qty: 20 TABLET | Refills: 0 | Status: SHIPPED | OUTPATIENT
Start: 2020-11-13 | End: 2020-11-20

## 2020-11-13 RX ORDER — LABETALOL HYDROCHLORIDE 5 MG/ML
5 INJECTION, SOLUTION INTRAVENOUS EVERY 10 MIN PRN
Status: DISCONTINUED | OUTPATIENT
Start: 2020-11-13 | End: 2020-11-13 | Stop reason: HOSPADM

## 2020-11-13 RX ORDER — ONDANSETRON 2 MG/ML
4 INJECTION INTRAMUSCULAR; INTRAVENOUS EVERY 8 HOURS PRN
Status: DISCONTINUED | OUTPATIENT
Start: 2020-11-13 | End: 2020-11-13 | Stop reason: HOSPADM

## 2020-11-13 RX ORDER — FENTANYL CITRATE 50 UG/ML
25 INJECTION, SOLUTION INTRAMUSCULAR; INTRAVENOUS
Status: DISCONTINUED | OUTPATIENT
Start: 2020-11-13 | End: 2020-11-13 | Stop reason: HOSPADM

## 2020-11-13 RX ORDER — METOCLOPRAMIDE HYDROCHLORIDE 5 MG/ML
10 INJECTION INTRAMUSCULAR; INTRAVENOUS
Status: DISCONTINUED | OUTPATIENT
Start: 2020-11-13 | End: 2020-11-13 | Stop reason: HOSPADM

## 2020-11-13 RX ORDER — SODIUM CHLORIDE 0.9 % (FLUSH) 0.9 %
10 SYRINGE (ML) INJECTION PRN
Status: DISCONTINUED | OUTPATIENT
Start: 2020-11-13 | End: 2020-11-13 | Stop reason: HOSPADM

## 2020-11-13 RX ORDER — KETOROLAC TROMETHAMINE 30 MG/ML
INJECTION, SOLUTION INTRAMUSCULAR; INTRAVENOUS PRN
Status: DISCONTINUED | OUTPATIENT
Start: 2020-11-13 | End: 2020-11-13 | Stop reason: SDUPTHER

## 2020-11-13 RX ORDER — MEPERIDINE HYDROCHLORIDE 50 MG/ML
12.5 INJECTION INTRAMUSCULAR; INTRAVENOUS; SUBCUTANEOUS EVERY 5 MIN PRN
Status: DISCONTINUED | OUTPATIENT
Start: 2020-11-13 | End: 2020-11-13 | Stop reason: HOSPADM

## 2020-11-13 RX ORDER — PROMETHAZINE HYDROCHLORIDE 25 MG/ML
6.25 INJECTION, SOLUTION INTRAMUSCULAR; INTRAVENOUS
Status: DISCONTINUED | OUTPATIENT
Start: 2020-11-13 | End: 2020-11-13 | Stop reason: HOSPADM

## 2020-11-13 RX ORDER — SODIUM CHLORIDE 9 MG/ML
INJECTION, SOLUTION INTRAVENOUS CONTINUOUS
Status: DISCONTINUED | OUTPATIENT
Start: 2020-11-13 | End: 2020-11-13 | Stop reason: HOSPADM

## 2020-11-13 RX ORDER — DIPHENHYDRAMINE HYDROCHLORIDE 50 MG/ML
12.5 INJECTION INTRAMUSCULAR; INTRAVENOUS
Status: DISCONTINUED | OUTPATIENT
Start: 2020-11-13 | End: 2020-11-13 | Stop reason: HOSPADM

## 2020-11-13 RX ORDER — ENALAPRILAT 2.5 MG/2ML
1.25 INJECTION INTRAVENOUS
Status: DISCONTINUED | OUTPATIENT
Start: 2020-11-13 | End: 2020-11-13 | Stop reason: HOSPADM

## 2020-11-13 RX ORDER — MORPHINE SULFATE 4 MG/ML
2 INJECTION, SOLUTION INTRAMUSCULAR; INTRAVENOUS EVERY 5 MIN PRN
Status: DISCONTINUED | OUTPATIENT
Start: 2020-11-13 | End: 2020-11-13 | Stop reason: HOSPADM

## 2020-11-13 RX ORDER — MAGNESIUM HYDROXIDE 1200 MG/15ML
LIQUID ORAL CONTINUOUS PRN
Status: COMPLETED | OUTPATIENT
Start: 2020-11-13 | End: 2020-11-13

## 2020-11-13 RX ORDER — MORPHINE SULFATE 4 MG/ML
4 INJECTION, SOLUTION INTRAMUSCULAR; INTRAVENOUS EVERY 5 MIN PRN
Status: DISCONTINUED | OUTPATIENT
Start: 2020-11-13 | End: 2020-11-13 | Stop reason: HOSPADM

## 2020-11-13 RX ORDER — ACETAMINOPHEN 325 MG/1
650 TABLET ORAL EVERY 4 HOURS PRN
Status: DISCONTINUED | OUTPATIENT
Start: 2020-11-13 | End: 2020-11-13 | Stop reason: HOSPADM

## 2020-11-13 RX ORDER — HYDROCODONE BITARTRATE AND ACETAMINOPHEN 5; 325 MG/1; MG/1
1 TABLET ORAL EVERY 4 HOURS PRN
Status: DISCONTINUED | OUTPATIENT
Start: 2020-11-13 | End: 2020-11-13 | Stop reason: HOSPADM

## 2020-11-13 RX ADMIN — Medication 2 G: at 10:37

## 2020-11-13 RX ADMIN — MEPERIDINE HYDROCHLORIDE 12.5 MG: 50 INJECTION, SOLUTION INTRAMUSCULAR; INTRAVENOUS; SUBCUTANEOUS at 12:07

## 2020-11-13 RX ADMIN — DEXAMETHASONE SODIUM PHOSPHATE 10 MG: 10 INJECTION, SOLUTION INTRAMUSCULAR; INTRAVENOUS at 10:46

## 2020-11-13 RX ADMIN — PROPOFOL 100 MG: 10 INJECTION, EMULSION INTRAVENOUS at 10:36

## 2020-11-13 RX ADMIN — LIDOCAINE HYDROCHLORIDE 50 MG: 10 INJECTION, SOLUTION EPIDURAL; INFILTRATION; INTRACAUDAL; PERINEURAL at 10:35

## 2020-11-13 RX ADMIN — ONDANSETRON HYDROCHLORIDE 4 MG: 2 INJECTION, SOLUTION INTRAMUSCULAR; INTRAVENOUS at 11:35

## 2020-11-13 RX ADMIN — SODIUM CHLORIDE, SODIUM LACTATE, POTASSIUM CHLORIDE, AND CALCIUM CHLORIDE: 600; 310; 30; 20 INJECTION, SOLUTION INTRAVENOUS at 08:27

## 2020-11-13 RX ADMIN — KETOROLAC TROMETHAMINE 30 MG: 30 INJECTION, SOLUTION INTRAMUSCULAR; INTRAVENOUS at 11:35

## 2020-11-13 RX ADMIN — FENTANYL CITRATE 100 MCG: 50 INJECTION INTRAMUSCULAR; INTRAVENOUS at 11:53

## 2020-11-13 RX ADMIN — MEPERIDINE HYDROCHLORIDE 12.5 MG: 50 INJECTION, SOLUTION INTRAMUSCULAR; INTRAVENOUS; SUBCUTANEOUS at 12:13

## 2020-11-13 RX ADMIN — FENTANYL CITRATE 50 MCG: 50 INJECTION INTRAMUSCULAR; INTRAVENOUS at 10:35

## 2020-11-13 RX ADMIN — PROPOFOL 200 MG: 10 INJECTION, EMULSION INTRAVENOUS at 10:35

## 2020-11-13 RX ADMIN — MIDAZOLAM 2 MG: 1 INJECTION INTRAMUSCULAR; INTRAVENOUS at 08:32

## 2020-11-13 ASSESSMENT — PAIN DESCRIPTION - PAIN TYPE: TYPE: SURGICAL PAIN

## 2020-11-13 ASSESSMENT — PAIN SCALES - GENERAL
PAINLEVEL_OUTOF10: 0
PAINLEVEL_OUTOF10: 7
PAINLEVEL_OUTOF10: 0
PAINLEVEL_OUTOF10: 6

## 2020-11-13 ASSESSMENT — LIFESTYLE VARIABLES: SMOKING_STATUS: 0

## 2020-11-13 ASSESSMENT — ENCOUNTER SYMPTOMS: SHORTNESS OF BREATH: 0

## 2020-11-13 NOTE — ANESTHESIA PRE PROCEDURE
Department of Anesthesiology  Preprocedure Note       Name:  Colton Peo   Age:  31 y.o.  :  1990                                          MRN:  [id number]         Date:  2020      Surgeon: Yessica Riggs):  Asencion Essex, MD    Procedure: Procedure(s):  LEFT GREATER SAPHENOUS VEIN RADIOFREQUENCY ABLATION, STAB PHLEBECTOMY    Medications prior to admission:   Prior to Admission medications    Medication Sig Start Date End Date Taking? Authorizing Provider   Glatiramer Acetate (COPAXONE SC) Inject into the skin three times a week    Yes Historical Provider, MD   amphetamine-dextroamphetamine (ADDERALL XR) 30 MG extended release capsule Take 30 mg by mouth every morning.   10/3/18  Yes Historical Provider, MD       Current medications:    Current Facility-Administered Medications   Medication Dose Route Frequency Provider Last Rate Last Dose    ceFAZolin (ANCEF) 2 g in 0.9% sodium chloride 50 mL IVPB  2 g Intravenous On Call to [facility], APRN        sodium chloride flush 0.9 % injection 10 mL  10 mL Intravenous 2 times per day Alaina American, APRN        sodium chloride flush 0.9 % injection 10 mL  10 mL Intravenous PRN Alaina American, APRN        lactated ringers infusion   Intravenous Continuous Abimael Mix, DO           Allergies:  No Known Allergies    Problem List:    Patient Active Problem List   Diagnosis Code    Multiple sclerosis ([facility] [address] G35    Depression F32.9    Varicose veins of legs I83.93    Venous reflux I87.2       Past Medical History:        Diagnosis Date    COVID-19 2020    not hospitalized; low grade temp/sweats, loss of smell/taste    Depression     MS (multiple sclerosis) ([facility] [address]     Varicose vein of leg        Past Surgical History:        Procedure Laterality Date    TYMPANOSTOMY TUBE PLACEMENT         Social History:    Social History     Tobacco Use    Smoking status: Never Smoker    Smokeless tobacco: Never Used   Substance Use Topics    Alcohol use: Yes     Frequency: 2-3 times a week     Comment: occ. social                                Counseling given: Not Answered      Vital Signs (Current):   Vitals:    11/13/20 0809   BP: 119/76   Pulse: 79   Resp: 16   Temp: 98.4 °F (36.9 °C)   TempSrc: Temporal   SpO2: 99%   Weight: 190 lb (86.2 kg)   Height: 6' (1.829 m)                                              BP Readings from Last 3 Encounters:   11/13/20 119/76   11/08/19 139/86   11/07/19 129/72       NPO Status: Time of last liquid consumption: 2300                        Time of last solid consumption: 2300                        Date of last liquid consumption: 11/12/20                        Date of last solid food consumption: 11/12/20    BMI:   Wt Readings from Last 3 Encounters:   11/13/20 190 lb (86.2 kg)   11/09/20 195 lb (88.5 kg)     Body mass index is 25.77 kg/m². CBC:   Lab Results   Component Value Date    WBC 5.5 11/09/2020    RBC 4.84 11/09/2020    HGB 14.9 11/09/2020    HCT 43.5 11/09/2020    MCV 89.9 11/09/2020    RDW 12.3 11/09/2020     11/09/2020       CMP:   Lab Results   Component Value Date     11/09/2020    K 4.4 11/09/2020     11/09/2020    CO2 26 11/09/2020    BUN 11 11/09/2020    CREATININE 0.7 11/09/2020    GFRAA >59 11/09/2020    LABGLOM >60 11/09/2020    GLUCOSE 96 11/09/2020    CALCIUM 9.2 11/09/2020       POC Tests: No results for input(s): POCGLU, POCNA, POCK, POCCL, POCBUN, POCHEMO, POCHCT in the last 72 hours.     Coags:   Lab Results   Component Value Date    PROTIME 14.0 11/09/2020    INR 1.09 11/09/2020    APTT 30.2 11/09/2020       HCG (If Applicable): No results found for: PREGTESTUR, PREGSERUM, HCG, HCGQUANT     ABGs: No results found for: PHART, PO2ART, MEC9QLQ, NJW5CGI, BEART, W7FLNFSI     Type & Screen (If Applicable):  No results found for: LABABO, LABRH    Drug/Infectious Status (If Applicable):  No results found for: HIV, HEPCAB    COVID-19 Screening (If Applicable):   Lab Results Component Value Date    COVID19 NOT DETECTED 2020         Anesthesia Evaluation  Patient summary reviewed and Nursing notes reviewed no history of anesthetic complications:   Airway: Mallampati: I  TM distance: >3 FB   Neck ROM: full  Mouth opening: > = 3 FB Dental: normal exam         Pulmonary:       (-) shortness of breath, sleep apnea and not a current smoker                          ROS comment: CXR:  Impression:   1. No acute cardiopulmonary process. Cardiovascular:Negative CV ROS  Exercise tolerance: good (>4 METS),         ECG reviewed               Beta Blocker:  Not on Beta Blocker      ROS comment: EK BPM  Sinus rhythm  Comparison Summary: No serial comparison made  Summary: Normal ECG     Neuro/Psych:   (+) neuromuscular disease: multiple sclerosis, psychiatric history:   (-) seizures and CVA            ROS comment: adderall use GI/Hepatic/Renal:        (-) GERD, liver disease and no renal disease       Endo/Other:    (+) no malignancy/cancer. (-) diabetes mellitus, blood dyscrasia, no malignancy/cancer               Abdominal:           Vascular:   + PVD, aortic or cerebral, .  - DVT and PE. Anesthesia Plan      general     ASA 1       Induction: intravenous. BIS  MIPS: Postoperative opioids intended and Prophylactic antiemetics administered. Anesthetic plan and risks discussed with patient.                       Prateek Goldstein DO   2020

## 2020-11-13 NOTE — OP NOTE
Date of Procedure: 11/13/2020    Preoperative diagnosis:      1. Reflux and insufficiency of the left long saphenous vein  2. Varicose veins of left lower extremity with pain/inflammation    Postoperative Diagnosis:  Same    Procedure:  1. Ultrasound guided cannulation of left long saphenous vein  2.  Radiofrequency ablation of left long saphenous vein   3. Stab phlebectomy of left lower extremity varicosities (5 stabs)    Surgeon:  Cadence Olivas. Maik Ennis MD    Anesthesia:  General plus tumescent anesthetic    Estimated blood loss:  50 ml    Procedure in detail:      After the risks, benefits, and alternatives of the procedure were explained to the patient, including how the procedure would be performed, the patient signed an informed consent. His left leg varicosities were marked in the upright position. The patient's leg was prepped and draped in the standard surgical fashion. The left long saphenous vein was then cannulated utilizing ultrasound guidance with a micropuncture needle and .018\" wire was placed through the needle. The needle was removed and replaced with a 7F sheath. The radiofrequency catheter was placed through the 7f sheath and under ultrasound guidance, the tip was directed to 2 cm from the saphenofemoral junction. Next, under ultrasound guidance, tumescent anesthetic solution was injected around the saphenous vein to create a halo of anesthesia along the entire vein from the sheath insertion site to the saphenofemoral junction. The tip of the catheter was again visualized to ensure position remains 2 cm from the saphenofemoral junction. The radiofrequency ablation then commensed while gentle pressure on the ultrasound probe/saphenous vein was applied. A total of 9 treatment cycles was then performed. The sheath and catheter were removed and both were inspected and intact. Pressure was applied at exit site for 5 minutes.       The patient did have one very large varicosity

## 2020-11-13 NOTE — ANESTHESIA POSTPROCEDURE EVALUATION
Department of Anesthesiology  Postprocedure Note    Patient: Gabriela Romero  MRN: [id number]  YOB: 1990  Date of evaluation: 11/13/2020  Time:  12:03 PM     Procedure Summary     Date:  11/13/20 Room / Location:  [address]    Anesthesia Start:  3547 Anesthesia Stop:  9558    Procedure:  LEFT GREATER SAPHENOUS VEIN RADIOFREQUENCY ABLATION, STAB PHLEBECTOMY (Left ) Diagnosis:  (D72.936, I87.2)    Surgeon:  Сергей Kingsley MD Responsible Provider:  PAULA Smith CRNA    Anesthesia Type:  general ASA Status:  1          Anesthesia Type: general    Felicity Phase I: Felicity Score: 10    Felicity Phase II:      Last vitals: Reviewed and per EMR flowsheets.        Anesthesia Post Evaluation    Patient location during evaluation: PACU  Patient participation: complete - patient participated  Level of consciousness: sleepy but conscious  Airway patency: patent  Nausea & Vomiting: no vomiting and no nausea  Complications: no  Cardiovascular status: hemodynamically stable  Respiratory status: room air and acceptable  Hydration status: stable  Comments: T 97

## 2020-11-16 ENCOUNTER — HOSPITAL ENCOUNTER (OUTPATIENT)
Dept: VASCULAR LAB | Age: 30
Discharge: HOME | End: 2020-11-16
Payer: COMMERCIAL

## 2020-11-16 PROCEDURE — 93971 EXTREMITY STUDY: CPT

## 2020-11-17 ENCOUNTER — VIRTUAL VISIT (OUTPATIENT)
Dept: VASCULAR SURGERY | Age: 30
End: 2020-11-17

## 2020-11-17 PROCEDURE — 99024 POSTOP FOLLOW-UP VISIT: CPT | Performed by: NURSE PRACTITIONER

## 2020-11-17 NOTE — PROGRESS NOTES
Teresa Wade is a 31 y.o. male who presents for post op evaluation. Patient had a left GSV ablation and stab phlebectomy 2 weeks ago. This was performed by Dr. Tiffany Ames. Post op symptoms reported none. Post op pain is minimal.      On evaluation today, incision is clean, dry, intact. Venous scan -   There is no evidence of deep venous thrombosis (DVT) in the left lower     extremity(ies).     Successful ablation of the greater saphenous vein in the left lower     extremity. Today we have recommended he Wash incision daily with soap and water and cover with dry gauze until healed. Continue with support hose. We will see as needed.   Sincerely,    PAULA Andrade

## 2020-12-13 ENCOUNTER — TELEMEDICINE (OUTPATIENT)
Dept: FAMILY MEDICINE CLINIC | Facility: TELEHEALTH | Age: 30
End: 2020-12-13

## 2020-12-13 DIAGNOSIS — H02.9 EYELID PROBLEM: Primary | ICD-10-CM

## 2020-12-13 PROBLEM — F32.A DEPRESSION: Status: ACTIVE | Noted: 2020-02-17

## 2020-12-13 PROBLEM — R53.83 FATIGUE: Status: ACTIVE | Noted: 2020-02-17

## 2020-12-13 PROBLEM — F98.8 ADD (ATTENTION DEFICIT DISORDER): Status: ACTIVE | Noted: 2017-03-09

## 2020-12-13 PROBLEM — E55.9 VITAMIN D DEFICIENCY: Status: ACTIVE | Noted: 2017-02-17

## 2020-12-13 PROBLEM — G35 MULTIPLE SCLEROSIS (HCC): Status: ACTIVE | Noted: 2017-02-15

## 2020-12-13 PROBLEM — Z87.898 HISTORY OF ACUTE ALCOHOL INTOXICATION: Status: ACTIVE | Noted: 2017-03-09

## 2020-12-13 PROBLEM — H46.9 OPTIC NEURITIS, RIGHT: Status: ACTIVE | Noted: 2019-11-05

## 2020-12-13 PROBLEM — F41.9 ANXIETY DISORDER: Status: ACTIVE | Noted: 2017-03-09

## 2020-12-13 PROCEDURE — 99213 OFFICE O/P EST LOW 20 MIN: CPT | Performed by: NURSE PRACTITIONER

## 2020-12-13 RX ORDER — POLYMYXIN B SULFATE AND TRIMETHOPRIM 1; 10000 MG/ML; [USP'U]/ML
1 SOLUTION OPHTHALMIC EVERY 4 HOURS
Qty: 1 EACH | Refills: 0 | Status: SHIPPED | OUTPATIENT
Start: 2020-12-13 | End: 2020-12-20

## 2020-12-13 RX ORDER — CLONAZEPAM 0.5 MG/1
0.5 TABLET ORAL
COMMUNITY
Start: 2020-12-02

## 2020-12-13 RX ORDER — GLATIRAMER 40 MG/ML
40 INJECTION, SOLUTION SUBCUTANEOUS 3 TIMES WEEKLY
COMMUNITY
Start: 2020-10-19

## 2020-12-13 RX ORDER — GLATIRAMER ACETATE 20 MG/ML
INJECTION, SOLUTION SUBCUTANEOUS
COMMUNITY
End: 2020-12-13

## 2020-12-13 RX ORDER — DEXTROAMPHETAMINE SACCHARATE, AMPHETAMINE ASPARTATE MONOHYDRATE, DEXTROAMPHETAMINE SULFATE AND AMPHETAMINE SULFATE 7.5; 7.5; 7.5; 7.5 MG/1; MG/1; MG/1; MG/1
30 CAPSULE, EXTENDED RELEASE ORAL
COMMUNITY
Start: 2020-11-23 | End: 2020-12-13

## 2020-12-13 RX ORDER — AZITHROMYCIN 250 MG/1
TABLET, FILM COATED ORAL
Qty: 6 TABLET | Refills: 0 | Status: SHIPPED | OUTPATIENT
Start: 2020-12-13

## 2020-12-13 NOTE — PROGRESS NOTES
Subjective   Chief Complaint   Patient presents with   • Eyelid Problem     This was a video visit, I spent a total of 25 minutes reviewing this chart.       Jakob Chong is a 30 y.o. male.     Pt reports a left eyelid bump that presented 2 weeks ago. He thinks it is a stye. He has been using warm compresses with no relief and is wanting to try antibiotics. He states initially there was very mild discomfort but now the bump is painless. He denies drainage and has never noticed a head near the lash line. He has never had this problem in the past.    Eye Problem   The left eye is affected. This is a new problem. Episode onset: 2 weeks. The problem occurs constantly. The problem has been unchanged. There was no injury mechanism. The patient is experiencing no pain. There is no known exposure to pink eye. Pertinent negatives include no blurred vision, eye discharge, double vision, eye redness, fever, foreign body sensation, itching, nausea, photophobia, recent URI or vomiting. Treatments tried: warm compresses.        No Known Allergies    Past Medical History:   Diagnosis Date   • ADHD (attention deficit hyperactivity disorder)    • Anxiety    • Depression    • Multiple sclerosis (CMS/HCC)        Past Surgical History:   Procedure Laterality Date   • ENDOVENOUS ABLATION SAPHENOUS VEIN W/ LASER         Social History     Socioeconomic History   • Marital status: Single     Spouse name: Not on file   • Number of children: Not on file   • Years of education: Not on file   • Highest education level: Not on file   Tobacco Use   • Smoking status: Never Smoker   • Smokeless tobacco: Never Used   Substance and Sexual Activity   • Alcohol use: Yes     Comment: occ   • Drug use: Defer   • Sexual activity: Defer       History reviewed. No pertinent family history.      Current Outpatient Medications:   •  clonazePAM (KlonoPIN) 0.5 MG tablet, Take 0.5 mg by mouth., Disp: , Rfl:   •  Glatiramer Acetate (Glatopa) 40 MG/ML solution  prefilled syringe, Inject 40 mg under the skin into the appropriate area as directed 3 (Three) Times a Week., Disp: , Rfl:   •  amphetamine-dextroamphetamine XR (ADDERALL XR) 30 MG 24 hr capsule, Take 30 mg by mouth, Disp: , Rfl:   •  azithromycin (Zithromax Z-Manuel) 250 MG tablet, Take 2 tablets by mouth on day 1, then 1 tablet daily on days 2-5, Disp: 6 tablet, Rfl: 0  •  naproxen (NAPROSYN) 500 MG tablet, Take 500 mg by mouth., Disp: , Rfl:   •  trimethoprim-polymyxin b (Polytrim) 81860-4.1 UNIT/ML-% ophthalmic solution, Administer 1 drop into the left eye Every 4 (Four) Hours for 7 days., Disp: 1 each, Rfl: 0  •  vitamin D (ERGOCALCIFEROL) 1.25 MG (96900 UT) capsule capsule, Take 50,000 Units by mouth., Disp: , Rfl:       Review of Systems   Constitutional: Negative for chills, diaphoresis, fatigue and fever.   Eyes: Negative for blurred vision, double vision, photophobia, pain, discharge, redness, itching and visual disturbance.   Gastrointestinal: Negative for nausea and vomiting.   Neurological: Negative for headache.        There were no vitals filed for this visit.    Objective   Physical Exam  Constitutional:       General: He is not in acute distress.     Appearance: Normal appearance. He is not ill-appearing, toxic-appearing or diaphoretic.   HENT:      Head: Normocephalic and atraumatic.   Eyes:      General:         Left eye: No foreign body, discharge or hordeolum.      Conjunctiva/sclera:      Left eye: Left conjunctiva is not injected. No chemosis, exudate or hemorrhage.    Neurological:      Mental Status: He is alert.          Procedures     Assessment/Plan   Diagnoses and all orders for this visit:    1. Eyelid problem (Primary)  -     azithromycin (Zithromax Z-Manuel) 250 MG tablet; Take 2 tablets by mouth on day 1, then 1 tablet daily on days 2-5  Dispense: 6 tablet; Refill: 0  -     trimethoprim-polymyxin b (Polytrim) 38760-0.1 UNIT/ML-% ophthalmic solution; Administer 1 drop into the left eye  Every 4 (Four) Hours for 7 days.  Dispense: 1 each; Refill: 0      Take medicine as prescribed.   Continue frequent warm compresses and be consistent with this for best results.   If symptoms worsen or do not improve follow up with your PCP or Opthalmology.           PLAN: Discussed with pt that i'm not sure if symptoms are due to a stye or a chalazion. I will treat for a stye, he is to continue warm compresses and if no improvement follow up with PCP or Opthalmology. Pt is agreeable to plan. Discussed dosing, side effects, recommended other symptomatic care.  Patient should follow up with primary care provider if symptoms worsen, fail to resolve or other symptoms need attention. Patient/family agree to the above.     MARY ELLEN Ackerman

## 2020-12-13 NOTE — PATIENT INSTRUCTIONS
Take medicine as prescribed.   Continue frequent warm compresses and be consistent with this for best results.   If symptoms worsen or do not improve follow up with your PCP or Opthalmology.         Chalazion    A chalazion is a swelling or lump on the eyelid. It can affect the upper eyelid or the lower eyelid.  What are the causes?  This condition may be caused by:  · Long-lasting (chronic) inflammation of the eyelid glands.  · A blocked oil gland in the eyelid.  What are the signs or symptoms?  Symptoms of this condition include:  · Swelling of the eyelid. The swelling may spread to areas around the eye.  · A hard lump on the eyelid.  · Blurry vision. The lump on the eyelid may make it hard to see out of the eye.  How is this diagnosed?  This condition is diagnosed with an examination of the eye.  How is this treated?  This condition is treated by applying a warm compress to the eyelid. If the condition does not improve, it may be treated with:  · Medicine that is injected into the chalazion by a health care provider.  · Surgery.  · Medicine that is applied to the eye.  Follow these instructions at home:  Managing pain and swelling  · Apply a warm, moist compress to the eyelid 4-6 times a day for 10-15 minutes at a time. This will help to open any blocked glands and to reduce redness and swelling.  · Apply over-the-counter and prescription medicines only as told by your health care provider.  General instructions  · Do not touch the chalazion.  · Do not try to remove the pus. Do not squeeze the chalazion or stick it with a pin or needle.  · Do not rub your eyes.  · Wash your hands often. Dry your hands with a clean towel.  · Keep your face, scalp, and eyebrows clean.  · Avoid wearing eye makeup.  · If the chalazion does not break open (rupture) on its own, return to your health care provider.  · Keep all follow-up appointments as told by your health care provider. This is important.  Contact a health care provider  if:  · Your eyelid has not improved in 4 weeks.  · Your eyelid is getting worse.  · You have a fever.  · The chalazion does not rupture on its own after a month of home treatment.  Get help right away if:  · You have pain in your eye.  · Your vision changes.  · The chalazion becomes painful or red.  · The chalazion gets bigger.  Summary  · A chalazion is a swelling or lump on the upper or lower eyelid.  · It may be caused by chronic inflammation or a blocked oil gland.  · Apply a warm, moist compress to the eyelid 4-6 times a day for 10-15 minutes at a time.  · Keep your face, scalp, and eyebrows clean.  This information is not intended to replace advice given to you by your health care provider. Make sure you discuss any questions you have with your health care provider.  Document Revised: 06/06/2019 Document Reviewed: 06/06/2019  [employer] Patient Education © 2020 [employer]    Stye    A stye, also known as a hordeolum, is a bump that forms on an eyelid. It may look like a pimple next to the eyelash. A stye can form inside the eyelid (internal stye) or outside the eyelid (external stye). A stye can cause redness, swelling, and pain on the eyelid.  Styes are very common. Anyone can get them at any age. They usually occur in just one eye, but you may have more than one in either eye.  What are the causes?  A stye is caused by an infection. The infection is almost always caused by bacteria called Staphylococcus aureus. This is a common type of bacteria that lives on the skin.  An internal stye may result from an infected oil-producing gland inside the eyelid. An external stye may be caused by an infection at the base of the eyelash (hair follicle).  What increases the risk?  You are more likely to develop a stye if:  · You have had a stye before.  · You have any of these conditions:  ? Diabetes.  ? Red, itchy, inflamed eyelids (blepharitis).  ? A skin condition such as seborrheic dermatitis or rosacea.  ? High fat  levels in your blood (lipids).  What are the signs or symptoms?  The most common symptom of a stye is eyelid pain. Internal styes are more painful than external styes. Other symptoms may include:  · Painful swelling of your eyelid.  · A scratchy feeling in your eye.  · Tearing and redness of your eye.  · Pus draining from the stye.  How is this diagnosed?  Your health care provider may be able to diagnose a stye just by examining your eye. The health care provider may also check to make sure:  · You do not have a fever or other signs of a more serious infection.  · The infection has not spread to other parts of your eye or areas around your eye.  How is this treated?  Most styes will clear up in a few days without treatment or with warm compresses applied to the area. You may need to use antibiotic drops or ointment to treat an infection.  In some cases, if your stye does not heal with routine treatment, your health care provider may drain pus from the stye using a thin blade or needle. This may be done if the stye is large, causing a lot of pain, or affecting your vision.  Follow these instructions at home:  · Take over-the-counter and prescription medicines only as told by your health care provider. This includes eye drops or ointments.  · If you were prescribed an antibiotic medicine, apply or use it as told by your health care provider. Do not stop using the antibiotic even if your condition improves.  · Apply a warm, wet cloth (warm compress) to your eye for 5-10 minutes, 4 times a day.  · Clean the affected eyelid as directed by your health care provider.  · Do not wear contact lenses or eye makeup until your stye has healed.  · Do not try to pop or drain the stye.  · Do not rub your eye.  Contact a health care provider if:  · You have chills or a fever.  · Your stye does not go away after several days.  · Your stye affects your vision.  · Your eyeball becomes swollen, red, or painful.  Get help right away  if:  · You have pain when moving your eye around.  Summary  · A stye is a bump that forms on an eyelid. It may look like a pimple next to the eyelash.  · A stye can form inside the eyelid (internal stye) or outside the eyelid (external stye). A stye can cause redness, swelling, and pain on the eyelid.  · Your health care provider may be able to diagnose a stye just by examining your eye.  · Apply a warm, wet cloth (warm compress) to your eye for 5-10 minutes, 4 times a day.  This information is not intended to replace advice given to you by your health care provider. Make sure you discuss any questions you have with your health care provider.  Document Revised: 11/30/2018 Document Reviewed: 08/30/2018  [employer] Patient Education © 2020 [employer]

## (undated) DEVICE — 3M™ STERI-STRIP™ REINFORCED ADHESIVE SKIN CLOSURES, R1546, 1/4 IN X 4 IN (6 MM X 100 MM), 10 STRIPS/ENVELOPE: Brand: 3M™ STERI-STRIP™

## (undated) DEVICE — PINNACLE R/O II INTRODUCER SHEATH WITH RADIOPAQUE MARKER: Brand: PINNACLE

## (undated) DEVICE — GUIDEWIRE WITH ICE™ HYDROPHILIC COATING: Brand: V-18™ CONTROL WIRE™

## (undated) DEVICE — SOLUTION IV 500ML 0.9% SOD CHL PH 5 INJ USP VIAFLX PLAS

## (undated) DEVICE — ADHESIVE SKIN CLOSURE 0.7CC TOP MICROBIAL APPL DERMBND ADV

## (undated) DEVICE — CATHETER ABLAT 7FR L100CM 0.025IN ENDOVENOUS RF CLOSUREFAST

## (undated) DEVICE — SUTURE MNCRYL 0 UNDYED CT1 Y496H

## (undated) DEVICE — COVER US PRB W15XL120CM W/ GEL RUBBERBAND TAPE STRP FLD GEN

## (undated) DEVICE — SUTURE ABSORBABLE BRAIDED 3-0 12X18 IN COAT UD VICRYL + VCP110G

## (undated) DEVICE — DECANTER FLD 9IN ST BG FOR ASEP TRNSF OF FLD

## (undated) DEVICE — GAUZE,SPONGE,FLUFF,6"X6.75",STRL,10/TRAY: Brand: MEDLINE

## (undated) DEVICE — DRESSING FOAM W4XL5CM THN ABSRB SELF ADH W/ SAFETAC MEPILEX

## (undated) DEVICE — TOWEL,OR,DSP,ST,BLUE,DLX,4/PK,20PK/CS: Brand: MEDLINE

## (undated) DEVICE — KIT TBNG L15.5FT DIA3/8IN SIL PRSS TB INSRT HI FLO

## (undated) DEVICE — BANDAGE,GAUZE,4.5"X4.1YD,STERILE,LF: Brand: MEDLINE

## (undated) DEVICE — GLOVE SURG SZ 75 L12IN FNGR THK94MIL TRNSLUC YEL LTX

## (undated) DEVICE — MASTISOL ADHESIVE LIQ 2/3ML

## (undated) DEVICE — INTRODUCER SHTH 7FR L11CM CLSR FAST SET

## (undated) DEVICE — SUTURE VCRL SZ 3-0 L27IN ABSRB UD L26MM SH 1/2 CIR J416H

## (undated) DEVICE — TELFA NON-ADHERENT ABSORBENT DRESSING: Brand: TELFA

## (undated) DEVICE — GLOVE SURG SZ 7 L12IN FNGR THK79MIL GRN LTX FREE

## (undated) DEVICE — BANDAGE COMPR W3INXL15FT BGE E SGL LAYERED CLP CLSR

## (undated) DEVICE — BANDAGE COMPR W4INXL15FT BGE E SGL LAYERED CLP CLSR

## (undated) DEVICE — PACK,UNIVERSAL,NO GOWNS: Brand: MEDLINE

## (undated) DEVICE — CATHETER KIT 5 FR 21 GAX7 CM MICROINTRODUCER GUIDEWIRE STIFF

## (undated) DEVICE — SOLUTION IV IRRIG WATER 1000ML POUR BRL 2F7114

## (undated) DEVICE — MINOR CDS: Brand: MEDLINE INDUSTRIES, INC.

## (undated) DEVICE — AMBU AURA-I U SIZE 5, DISPOSABLE LARYNGEAL MASK: Brand: AURA-I